# Patient Record
Sex: FEMALE | Race: OTHER | HISPANIC OR LATINO | URBAN - METROPOLITAN AREA
[De-identification: names, ages, dates, MRNs, and addresses within clinical notes are randomized per-mention and may not be internally consistent; named-entity substitution may affect disease eponyms.]

---
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
## 2021-01-11 ENCOUNTER — APPOINTMENT (OUTPATIENT)
Dept: PEDIATRIC ALLERGY IMMUNOLOGY | Facility: CLINIC | Age: 21
End: 2021-01-11
Payer: MEDICAID

## 2021-01-11 PROCEDURE — 95117 IMMUNOTHERAPY INJECTIONS: CPT

## 2021-01-11 PROCEDURE — 95149Z: CUSTOM

## 2021-01-11 PROCEDURE — 99072 ADDL SUPL MATRL&STAF TM PHE: CPT

## 2021-02-08 ENCOUNTER — APPOINTMENT (OUTPATIENT)
Dept: PEDIATRIC ALLERGY IMMUNOLOGY | Facility: CLINIC | Age: 21
End: 2021-02-08
Payer: MEDICAID

## 2021-02-08 PROCEDURE — 95149Z: CUSTOM

## 2021-02-08 PROCEDURE — 95117 IMMUNOTHERAPY INJECTIONS: CPT

## 2021-02-08 PROCEDURE — 99072 ADDL SUPL MATRL&STAF TM PHE: CPT

## 2021-03-08 ENCOUNTER — APPOINTMENT (OUTPATIENT)
Dept: PEDIATRIC ALLERGY IMMUNOLOGY | Facility: CLINIC | Age: 21
End: 2021-03-08
Payer: MEDICAID

## 2021-03-08 PROCEDURE — 99072 ADDL SUPL MATRL&STAF TM PHE: CPT

## 2021-03-08 PROCEDURE — 95149Z: CUSTOM

## 2021-03-08 PROCEDURE — 95117 IMMUNOTHERAPY INJECTIONS: CPT

## 2021-05-03 ENCOUNTER — APPOINTMENT (OUTPATIENT)
Dept: PEDIATRIC ALLERGY IMMUNOLOGY | Facility: CLINIC | Age: 21
End: 2021-05-03
Payer: MEDICAID

## 2021-05-03 PROCEDURE — 99072 ADDL SUPL MATRL&STAF TM PHE: CPT

## 2021-05-03 PROCEDURE — 95117 IMMUNOTHERAPY INJECTIONS: CPT

## 2021-05-03 PROCEDURE — 95149Z: CUSTOM

## 2021-07-29 ENCOUNTER — APPOINTMENT (OUTPATIENT)
Dept: PEDIATRIC ALLERGY IMMUNOLOGY | Facility: CLINIC | Age: 21
End: 2021-07-29
Payer: MEDICAID

## 2021-07-29 DIAGNOSIS — J30.81 ALLERGIC RHINITIS DUE TO ANIMAL (CAT) (DOG) HAIR AND DANDER: ICD-10-CM

## 2021-07-29 DIAGNOSIS — J30.89 OTHER ALLERGIC RHINITIS: ICD-10-CM

## 2021-07-29 DIAGNOSIS — J30.1 ALLERGIC RHINITIS DUE TO POLLEN: ICD-10-CM

## 2021-07-29 PROCEDURE — ZZZZZ: CPT

## 2021-09-03 ENCOUNTER — APPOINTMENT (OUTPATIENT)
Dept: PEDIATRIC ALLERGY IMMUNOLOGY | Facility: CLINIC | Age: 21
End: 2021-09-03
Payer: MEDICAID

## 2021-09-03 DIAGNOSIS — T63.91XA TOXIC EFFECT OF CONTACT WITH UNSPECIFIED VENOMOUS ANIMAL, ACCIDENTAL (UNINTENTIONAL), INITIAL ENCOUNTER: ICD-10-CM

## 2021-09-03 DIAGNOSIS — Z51.6 ENCOUNTER FOR DESENSITIZATION TO ALLERGENS: ICD-10-CM

## 2021-09-03 DIAGNOSIS — Z91.038 OTHER INSECT ALLERGY STATUS: ICD-10-CM

## 2021-09-03 DIAGNOSIS — T63.441A TOXIC EFFECT OF VENOM OF BEES, ACCIDENTAL (UNINTENTIONAL), INITIAL ENCOUNTER: ICD-10-CM

## 2021-09-03 PROCEDURE — 99443: CPT

## 2021-09-04 PROBLEM — T63.441A HYMENOPTERA REACTION: Status: ACTIVE | Noted: 2019-05-13

## 2021-09-04 PROBLEM — Z91.038 HYMENOPTERA ALLERGY: Status: ACTIVE | Noted: 2019-04-13

## 2021-09-04 PROBLEM — T63.91XA VENOM-INDUCED ANAPHYLAXIS: Status: ACTIVE | Noted: 2019-02-28

## 2021-09-04 RX ORDER — EPINEPHRINE 0.3 MG/.3ML
0.3 INJECTION INTRAMUSCULAR
Qty: 2 | Refills: 3 | Status: ACTIVE | COMMUNITY
Start: 1900-01-01 | End: 1900-01-01

## 2021-09-04 NOTE — HISTORY OF PRESENT ILLNESS
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 She is currently on venom IT stock concentration of 0.2 ml. Patient started venom IT in 5/2019, reached maintenance dose (stock conc. 1 ml) 9/2019. Since 2020 she has been late/irregular about her office appointments for IT, and her IT dose was therefore decreased each time she was late.\par Patient reports h/p local swelling at IT injection site which lasted 3 days, but never had systemic allergic reactions to IT. \par Denies any bee or wasp sting since starting allergy shots. Carries her EpiPens at all times with her.

## 2021-09-04 NOTE — REASON FOR VISIT
[Home] : at home, [unfilled] , at the time of the visit. [Other Location: e.g. Home (Enter Location, City,State)___] : at [unfilled] [Verbal consent obtained from patient] : the patient, [unfilled] [[employer] ] : provided by [employer]   [FreeTextEntry1] : [id number] [FreeTextEntry2] : Sandhya Carver [TWNoteComboBox1] : [place]

## 2021-09-07 ENCOUNTER — APPOINTMENT (OUTPATIENT)
Dept: PEDIATRIC ALLERGY IMMUNOLOGY | Facility: CLINIC | Age: 21
End: 2021-09-07

## 2023-01-12 NOTE — ED PROVIDER NOTE - CONSTITUTIONAL, MLM
Detail Level: Detailed Detail Level: Zone Detail Level: Simple normal (ped)... In no apparent distress, appears well developed and well nourished.